# Patient Record
Sex: FEMALE | Race: BLACK OR AFRICAN AMERICAN | NOT HISPANIC OR LATINO | URBAN - METROPOLITAN AREA
[De-identification: names, ages, dates, MRNs, and addresses within clinical notes are randomized per-mention and may not be internally consistent; named-entity substitution may affect disease eponyms.]

---

## 2023-06-30 LAB
CHLAMYDIA TRACH., AMPLIFIED: NEGATIVE
N. GONORRHEA, AMPLIFIED: NEGATIVE
TRICHOMONAS VAGINALIS: NEGATIVE

## 2023-10-12 PROBLEM — N92.0 MENORRHAGIA WITH REGULAR CYCLE: Status: ACTIVE | Noted: 2023-10-12

## 2023-10-12 RX ORDER — INDAPAMIDE 1.25 MG/1
1 TABLET ORAL DAILY
COMMUNITY

## 2023-10-13 ENCOUNTER — OFFICE VISIT (OUTPATIENT)
Dept: GASTROENTEROLOGY | Facility: EXTERNAL LOCATION | Age: 48
End: 2023-10-13
Payer: COMMERCIAL

## 2023-10-13 DIAGNOSIS — K57.30 DIVERTICULOSIS OF LARGE INTESTINE WITHOUT DIVERTICULITIS: Primary | ICD-10-CM

## 2023-10-13 DIAGNOSIS — Z12.11 ENCOUNTER FOR SCREENING FOR MALIGNANT NEOPLASM OF COLON: ICD-10-CM

## 2023-10-13 PROCEDURE — 45378 DIAGNOSTIC COLONOSCOPY: CPT | Performed by: INTERNAL MEDICINE

## 2023-10-30 ENCOUNTER — ANCILLARY PROCEDURE (OUTPATIENT)
Dept: RADIOLOGY | Facility: CLINIC | Age: 48
End: 2023-10-30
Payer: COMMERCIAL

## 2023-10-30 DIAGNOSIS — Z12.39 ENCOUNTER FOR OTHER SCREENING FOR MALIGNANT NEOPLASM OF BREAST: ICD-10-CM

## 2023-10-30 PROCEDURE — 77067 SCR MAMMO BI INCL CAD: CPT

## 2023-10-30 PROCEDURE — 77067 SCR MAMMO BI INCL CAD: CPT | Mod: BILATERAL PROCEDURE | Performed by: RADIOLOGY

## 2023-10-30 PROCEDURE — 77063 BREAST TOMOSYNTHESIS BI: CPT | Mod: BILATERAL PROCEDURE | Performed by: RADIOLOGY

## 2023-11-06 ENCOUNTER — HOSPITAL ENCOUNTER (OUTPATIENT)
Dept: RADIOLOGY | Facility: EXTERNAL LOCATION | Age: 48
Discharge: HOME | End: 2023-11-06
Payer: COMMERCIAL

## 2024-05-22 ENCOUNTER — TELEPHONE (OUTPATIENT)
Dept: OBSTETRICS AND GYNECOLOGY | Facility: CLINIC | Age: 49
End: 2024-05-22
Payer: COMMERCIAL

## 2024-05-22 NOTE — TELEPHONE ENCOUNTER
Received request form Dr Lopez for NP appt with [facility] for Menorrhagia / attempted to reach pt on 05/21/2024 and today l/eft message to call office

## 2024-05-24 NOTE — TELEPHONE ENCOUNTER
Spoke to pt / NP appt sched for 08/15/2024 / for fibroids / heavy bleeding / offered sooner appt pt declined due conflict in her sched.

## 2024-08-15 ENCOUNTER — APPOINTMENT (OUTPATIENT)
Dept: OBSTETRICS AND GYNECOLOGY | Facility: CLINIC | Age: 49
End: 2024-08-15
Payer: COMMERCIAL

## 2024-08-15 ENCOUNTER — TELEPHONE (OUTPATIENT)
Dept: OBSTETRICS AND GYNECOLOGY | Facility: CLINIC | Age: 49
End: 2024-08-15
Payer: COMMERCIAL

## 2024-08-15 NOTE — TELEPHONE ENCOUNTER
Np to practice NP appt was sched with Dr Millard for today and Pt cancelled appt  through my chart  due to heavy bleeding today / pt was referred by Dr Garcia for menorrhagia / fibroids/ per Dr Millard pt would be better served by seeing one the office gyn surgeons / Appt sched for 09/18/2024 with Dr Tineo ( appt time and date per April )

## 2024-08-15 NOTE — PROGRESS NOTES
Annual  Subjective   Marni Gutierrez is a 49 y.o. female who is referred from pcp John d/t anemia/heavy menses/fibroids; last gyn exam 7/6/2023 w Dr. Carol Ann Malik at [facility]; prior to that w [facility]           Review of Systems    Objective   There were no vitals taken for this visit.       General:   Alert and oriented, in no acute distress   Neck: Supple. No visible thyromegaly.    Breast/Axilla: Normal to palpation bilaterally without masses, skin changes, or nipple discharge.    Abdomen: Soft, non-tender, without masses or organomegaly   Vulva: Normal architecture without erythema, masses, or lesions.    Vagina: Normal mucosa without lesions, masses, or atrophy. No abnormal vaginal discharge.    Cervix: Normal without masses, lesions, or signs of cervicitis   Uterus: Normal, mobile, non-enlarged uterus   Adnexa: Normal without masses or lesions   Pelvic Floor normal   Psych Normal affect. Normal mood.      Assessment/Plan   {Assess/Plan SmartLinks (Optional):02473}    Routine annual    Pap due***  Mammogram    Sandra Millard MD

## 2024-09-09 ENCOUNTER — APPOINTMENT (OUTPATIENT)
Dept: OBSTETRICS AND GYNECOLOGY | Facility: CLINIC | Age: 49
End: 2024-09-09
Payer: COMMERCIAL

## 2024-09-18 ENCOUNTER — OFFICE VISIT (OUTPATIENT)
Dept: OBSTETRICS AND GYNECOLOGY | Facility: CLINIC | Age: 49
End: 2024-09-18
Payer: COMMERCIAL

## 2024-09-18 VITALS
BODY MASS INDEX: 34.85 KG/M2 | SYSTOLIC BLOOD PRESSURE: 137 MMHG | HEIGHT: 62 IN | WEIGHT: 189.4 LBS | DIASTOLIC BLOOD PRESSURE: 85 MMHG

## 2024-09-18 DIAGNOSIS — D25.9 UTERINE LEIOMYOMA, UNSPECIFIED LOCATION: Primary | ICD-10-CM

## 2024-09-18 DIAGNOSIS — N92.1 MENORRHAGIA WITH IRREGULAR CYCLE: ICD-10-CM

## 2024-09-18 PROCEDURE — 99203 OFFICE O/P NEW LOW 30 MIN: CPT | Performed by: OBSTETRICS & GYNECOLOGY

## 2024-09-18 PROCEDURE — 3008F BODY MASS INDEX DOCD: CPT | Performed by: OBSTETRICS & GYNECOLOGY

## 2024-09-18 PROCEDURE — 99213 OFFICE O/P EST LOW 20 MIN: CPT | Performed by: OBSTETRICS & GYNECOLOGY

## 2024-09-18 RX ORDER — IBUPROFEN 800 MG/1
TABLET ORAL
Qty: 90 TABLET | Refills: 1 | Status: SHIPPED | OUTPATIENT
Start: 2024-09-18

## 2024-09-18 RX ORDER — FERROUS SULFATE 325(65) MG
1 TABLET ORAL
COMMUNITY
Start: 2024-05-09

## 2024-09-18 ASSESSMENT — PATIENT HEALTH QUESTIONNAIRE - PHQ9
2. FEELING DOWN, DEPRESSED OR HOPELESS: NOT AT ALL
1. LITTLE INTEREST OR PLEASURE IN DOING THINGS: NOT AT ALL
SUM OF ALL RESPONSES TO PHQ9 QUESTIONS 1 & 2: 0

## 2024-09-18 ASSESSMENT — LIFESTYLE VARIABLES
SKIP TO QUESTIONS 9-10: 0
HOW MANY STANDARD DRINKS CONTAINING ALCOHOL DO YOU HAVE ON A TYPICAL DAY: 3 OR 4
HOW OFTEN DO YOU HAVE SIX OR MORE DRINKS ON ONE OCCASION: NEVER
HOW OFTEN DO YOU HAVE A DRINK CONTAINING ALCOHOL: 2-4 TIMES A MONTH
AUDIT-C TOTAL SCORE: 3

## 2024-09-18 ASSESSMENT — ENCOUNTER SYMPTOMS
LOSS OF SENSATION IN FEET: 1
OCCASIONAL FEELINGS OF UNSTEADINESS: 0
DEPRESSION: 0

## 2024-09-18 ASSESSMENT — PAIN SCALES - GENERAL: PAINLEVEL: 1

## 2024-09-18 NOTE — PROGRESS NOTES
GYN OFFICE VISIT    Patient Name:  Marni Gutierrez  :  1975  MR #:  [id number]  Acct #:  [id number]      ASSESSMENT/PLAN:   1. Uterine leiomyoma, unspecified location    - ibuprofen 800 mg tablet; Take 1 tablet PO every 8 hours prn heavy menstrual flow or pain or fever  Dispense: 90 tablet; Refill: 1  - CBC; Future  - TSH; Future  - Ferritin; Future  - Iron and TIBC; Future    2. Menorrhagia with irregular cycle    - ibuprofen 800 mg tablet; Take 1 tablet PO every 8 hours prn heavy menstrual flow or pain or fever  Dispense: 90 tablet; Refill: 1  - CBC; Future  - TSH; Future  - Ferritin; Future  - Iron and TIBC; Future     -Discussed medication options: Ibuprofen, Myfembree, birth control for sxs control. Reviewed safety, RBA, adverse rxs, potential side effects.  -Discussed myomectomy, ASHLEY-BS.  Patient is likely to select ASHLEY after discussed of RBA.  -Will complete labs. FU virtual visit for planning in 1 month.    Chief Complaint   Patient presents with    New Patient Visit    Menstrual Problem       Marni Gutierrez is a 49 y.o. F  Patient's last menstrual period was 2024 (exact date). who presents for heavy periods. Known uterine fibroids.   Change pads Q 2hrs. No tampons. Not painful.   Painful intercourse, sometimes.   Vaginal discharge is not odorous, no itching. Seems normal.  No sxs of UTI. +Frequency.   Bowel habits are on and off.  Known left kidney solid tumor, 2.5 cm, monitoring.  Abdominal weight gain  ER visit - 2024    Unprotected sex. No pregnancies.    Past Medical History:   Diagnosis Date    Anemia     Asthma (HHS-HCC)     Fibroids     Menorrhagia        Past Surgical History:   Procedure Laterality Date    MYOMECTOMY  2003       Social History     Socioeconomic History    Marital status:      Spouse name: Not on file    Number of children: Not on file    Years of education: Not on file    Highest education level: Not on file   Occupational History    Not on file  "  Tobacco Use    Smoking status: Never    Smokeless tobacco: Never   Vaping Use    Vaping status: Never Used   Substance and Sexual Activity    Alcohol use: Not on file    Drug use: Never    Sexual activity: Yes     Birth control/protection: None   Other Topics Concern    Not on file   Social History Narrative    Not on file     Social Determinants of Health     Financial Resource Strain: Not on file   Food Insecurity: Not on file   Transportation Needs: Not on file   Physical Activity: Not on file   Stress: Not on file   Social Connections: Not on file   Intimate Partner Violence: Not At Risk (9/18/2024)    Humiliation, Afraid, Rape, and Kick questionnaire     Fear of Current or Ex-Partner: No     Emotionally Abused: No     Physically Abused: No     Sexually Abused: No   Housing Stability: Not on file       Family History   Problem Relation Name Age of Onset    Breast cancer Other  40        maternal cousin       Prior to Admission medications    Medication Sig Start Date End Date Taking? Authorizing Provider   ferrous sulfate, 325 mg ferrous sulfate, tablet Take 1 tablet (325 mg) by mouth every 12 hours. 5/9/24  Yes Historical Provider, MD   indapamide (Lozol) 1.25 mg tablet Take 1 tablet (1.25 mg) by mouth once daily.    Historical Provider, MD       Allergies   Allergen Reactions    Shellfish Derived Itching          ROS: Review of Systems  See HPI. Otherwise non-contributory    OBJECTIVE:   /85   Ht 1.575 m (5' 2\")   Wt 85.9 kg (189 lb 6.4 oz)   LMP 09/07/2024 (Exact Date) Comment: Very heavy , but regular  BMI 34.64 kg/m²   Body mass index is 34.64 kg/m².     Physical Exam  GENERAL:   General Appearance:  well-developed, well-nourished, no functional handicap, well-groomed.  Hygiene:  good.  Ill-appearance:  none.    HEENT: NC/AT head.  Neck is supple.  Speech:  clear.  Eye contact:  normal.  LUNGS:  Effort:  no respiratory distress.    ABDOMEN: Tenderness:  none.  Distention:  none. " Uteromegaly  GENITOURINARY - FEMALE: Bladder:  NT    Uterus:  : 20 week sized uterus and wide, non tender.  Adnexa:  Difficult to discern  DERMATOLOGY:  Skin:  normal.   EXTREMITIES: Normal:  no anomalies.  Edema:  none.    NEUROLOGICAL: Orientation:  alert and oriented x 3.   PSYCHOLOGY: Affect:  appropriate.  Mood:  pleasant.       Labs reviewed:  yes    Imaging reviewed:  yes    Note: This dictation was generated using Dragon voice recognition software. Please excuse any grammatical or spelling errors that may have occurred using the system.

## 2024-10-03 ENCOUNTER — LAB (OUTPATIENT)
Dept: LAB | Facility: LAB | Age: 49
End: 2024-10-03
Payer: COMMERCIAL

## 2024-10-03 DIAGNOSIS — N92.1 MENORRHAGIA WITH IRREGULAR CYCLE: ICD-10-CM

## 2024-10-03 DIAGNOSIS — D25.9 UTERINE LEIOMYOMA, UNSPECIFIED LOCATION: ICD-10-CM

## 2024-10-03 LAB
ERYTHROCYTE [DISTWIDTH] IN BLOOD BY AUTOMATED COUNT: 20.5 % (ref 11.5–14.5)
FERRITIN SERPL-MCNC: 18 NG/ML (ref 8–150)
HCT VFR BLD AUTO: 25 % (ref 36–46)
HGB BLD-MCNC: 6.7 G/DL (ref 12–16)
IRON SATN MFR SERPL: ABNORMAL %
IRON SERPL-MCNC: 14 UG/DL (ref 35–150)
MCH RBC QN AUTO: 18.7 PG (ref 26–34)
MCHC RBC AUTO-ENTMCNC: 26.8 G/DL (ref 32–36)
MCV RBC AUTO: 70 FL (ref 80–100)
NRBC BLD-RTO: 0 /100 WBCS (ref 0–0)
PLATELET # BLD AUTO: 234 X10*3/UL (ref 150–450)
RBC # BLD AUTO: 3.59 X10*6/UL (ref 4–5.2)
TIBC SERPL-MCNC: ABNORMAL UG/DL
TSH SERPL-ACNC: 1.74 MIU/L (ref 0.44–3.98)
UIBC SERPL-MCNC: >450 UG/DL (ref 110–370)
WBC # BLD AUTO: 4.8 X10*3/UL (ref 4.4–11.3)

## 2024-10-03 PROCEDURE — 82728 ASSAY OF FERRITIN: CPT

## 2024-10-03 PROCEDURE — 84443 ASSAY THYROID STIM HORMONE: CPT

## 2024-10-03 PROCEDURE — 36415 COLL VENOUS BLD VENIPUNCTURE: CPT

## 2024-10-03 PROCEDURE — 83540 ASSAY OF IRON: CPT

## 2024-10-03 PROCEDURE — 85027 COMPLETE CBC AUTOMATED: CPT

## 2024-10-03 PROCEDURE — 83550 IRON BINDING TEST: CPT

## 2024-10-10 ENCOUNTER — TELEMEDICINE (OUTPATIENT)
Dept: OBSTETRICS AND GYNECOLOGY | Facility: CLINIC | Age: 49
End: 2024-10-10
Payer: COMMERCIAL

## 2024-10-10 DIAGNOSIS — N92.0 MENORRHAGIA WITH REGULAR CYCLE: ICD-10-CM

## 2024-10-10 DIAGNOSIS — N92.1 MENORRHAGIA WITH IRREGULAR CYCLE: ICD-10-CM

## 2024-10-10 DIAGNOSIS — D50.0 ANEMIA DUE TO BLOOD LOSS, CHRONIC: Primary | ICD-10-CM

## 2024-10-10 DIAGNOSIS — N85.2 UTERINE HYPERTROPHY: ICD-10-CM

## 2024-10-10 DIAGNOSIS — D25.9 UTERINE LEIOMYOMA, UNSPECIFIED LOCATION: ICD-10-CM

## 2024-10-10 PROCEDURE — 99213 OFFICE O/P EST LOW 20 MIN: CPT | Performed by: OBSTETRICS & GYNECOLOGY

## 2024-10-10 PROCEDURE — 99213 OFFICE O/P EST LOW 20 MIN: CPT | Mod: 95 | Performed by: OBSTETRICS & GYNECOLOGY

## 2024-10-10 RX ORDER — ALBUTEROL SULFATE 0.83 MG/ML
3 SOLUTION RESPIRATORY (INHALATION) AS NEEDED
OUTPATIENT
Start: 2024-10-14

## 2024-10-10 RX ORDER — FAMOTIDINE 10 MG/ML
20 INJECTION INTRAVENOUS ONCE AS NEEDED
OUTPATIENT
Start: 2024-10-14

## 2024-10-10 RX ORDER — EPINEPHRINE 0.3 MG/.3ML
0.3 INJECTION SUBCUTANEOUS EVERY 5 MIN PRN
OUTPATIENT
Start: 2024-10-14

## 2024-10-10 RX ORDER — DIPHENHYDRAMINE HYDROCHLORIDE 50 MG/ML
50 INJECTION INTRAMUSCULAR; INTRAVENOUS AS NEEDED
OUTPATIENT
Start: 2024-10-14

## 2024-10-10 RX ORDER — RELUGOLIX, ESTRADIOL HEMIHYDRATE, AND NORETHINDRONE ACETATE 40; 1; .5 MG/1; MG/1; MG/1
1 TABLET, FILM COATED ORAL DAILY
Qty: 90 TABLET | Refills: 3 | Status: SHIPPED | OUTPATIENT
Start: 2024-10-10 | End: 2025-01-08

## 2024-10-10 NOTE — PROGRESS NOTES
Consent:  Verbal consent was requested and obtained from patient on this date for a telehealth visit to determine if a office visit would be necessary for the patient's complaint(s).    Referring Physician: No referring provider defined for this encounter.  Primary Care Provider: Jose Miguel Lopez MD     Subjective    HPI: 49-year-old -American female for follow-up.  She completed labs to screen for her anemia status.  She was thinking about her treatment options as discussed in the last visit.  She was offered pharmacotherapy for menstrual control versus total abdominal hysterectomy with BS due to size of uterus.  History of blood transfusion.  No significant pain.    Past Medical History:   Diagnosis Date    Anemia     Asthma (HHS-HCC)     childhood.    Fibroids     Menorrhagia         Past Surgical History:   Procedure Laterality Date    MYOMECTOMY  2003        Social History     Tobacco Use    Smoking status: Never    Smokeless tobacco: Never   Vaping Use    Vaping status: Never Used   Substance Use Topics    Drug use: Never        Current Outpatient Medications   Medication Instructions    ferrous sulfate, 325 mg ferrous sulfate, tablet 1 tablet, oral, Every 12 hours scheduled (0630,1830)    ibuprofen 800 mg tablet Take 1 tablet PO every 8 hours prn heavy menstrual flow or pain or fever    indapamide (Lozol) 1.25 mg tablet 1 tablet, oral, Daily      Objective    BSA: There is no height or weight on file to calculate BSA.  There were no vitals taken for this visit.     Physical Exam  General: AAOx3 in NAD   Psych: mood and affect are appropiate. Able to consent.     Previous pelvic ultrasound: Enlarged multifibroid uterus.    Labs: Iron deficiency anemia    Assessment/Plan    1. Anemia due to blood loss, chronic (Primary)    - iron, carb-FA-C-B6-B12-zinc 150-1.-10 mg tablet; Take 1 tablet by mouth once daily.  Dispense: 90 tablet; Refill: 3  - relugolix-estradiol-norethindr (Myfembree) 40-1-0.5  mg tablet; Take 1 tablet by mouth once daily.  Dispense: 90 tablet; Refill: 3  -Iron infusions    2. Uterine leiomyoma, unspecified location    - relugolix-estradiol-norethindr (Myfembree) 40-1-0.5 mg tablet; Take 1 tablet by mouth once daily.  Dispense: 90 tablet; Refill: 3    3. Menorrhagia with irregular cycle    - relugolix-estradiol-norethindr (Myfembree) 40-1-0.5 mg tablet; Take 1 tablet by mouth once daily.  Dispense: 90 tablet; Refill: 3    4. Uterine hypertrophy      5. Menorrhagia with regular cycle        Treatment Plans    -Discussed medication options: Ibuprofen, Myfembree, birth control for sxs control. Reviewed safety, RBA, adverse rxs, potential side effects.  -Discussed myomectomy, ASHLEY-BS.  She will complete therapy for the anemia to reduce surgical risks.  Repeat labs in about 3 months.     All new and/or current medications discussed and reviewed including side effects with patient/caregiver, Understanding:  Caregiver/Patient expressed understanding., Adherence:  Barriers to adherence identified and discussed if present.

## 2024-12-17 ENCOUNTER — TELEMEDICINE (OUTPATIENT)
Dept: OBSTETRICS AND GYNECOLOGY | Facility: CLINIC | Age: 49
End: 2024-12-17
Payer: COMMERCIAL

## 2024-12-17 DIAGNOSIS — D25.9 UTERINE LEIOMYOMA, UNSPECIFIED LOCATION: ICD-10-CM

## 2024-12-17 DIAGNOSIS — N85.2 UTERINE HYPERTROPHY: ICD-10-CM

## 2024-12-17 DIAGNOSIS — Z51.81 MEDICATION MONITORING ENCOUNTER: ICD-10-CM

## 2024-12-17 DIAGNOSIS — D50.0 ANEMIA DUE TO BLOOD LOSS, CHRONIC: Primary | ICD-10-CM

## 2024-12-17 DIAGNOSIS — N92.1 MENORRHAGIA WITH IRREGULAR CYCLE: ICD-10-CM

## 2024-12-17 PROCEDURE — 99213 OFFICE O/P EST LOW 20 MIN: CPT | Performed by: OBSTETRICS & GYNECOLOGY

## 2024-12-17 NOTE — PROGRESS NOTES
Consent:  Verbal consent was requested and obtained from patient on this date for a telehealth visit to determine if a office visit would be necessary for the patient's complaint(s).    Referring Physician: No referring provider defined for this encounter.  Primary Care Provider: Jose Miguel Lopez MD     Subjective    HPI: 50 yo AAF G0 for follow-up.   Before Myfembree - 2 heavy flow days, 2 normal days.  Myfembree - no heavy flow, but prolonged, light flow to nothing. No side effects. No mood swings. Breakthrough once.  No headaches, no N/V.  She had some cramping before and during the period.   Taking oral iron. Rare constipation.      Past Medical History:   Diagnosis Date    Anemia     Asthma (HHS-HCC)     childhood.    Fibroids     Menorrhagia         Past Surgical History:   Procedure Laterality Date    MYOMECTOMY  2003        Social History     Tobacco Use    Smoking status: Never    Smokeless tobacco: Never   Vaping Use    Vaping status: Never Used   Substance Use Topics    Drug use: Never        Current Outpatient Medications   Medication Instructions    iron, carb-FA-C-B6-B12-zinc 150-1.-10 mg tablet 1 tablet, oral, Daily    relugolix-estradiol-norethindr (Myfembree) 40-1-0.5 mg tablet 1 tablet, oral, Daily      Objective    BSA: There is no height or weight on file to calculate BSA.  There were no vitals taken for this visit.     Physical Exam  General: AAOx3 in NAD   Psych: mood and affect are appropiate. Able to consent.     Pelvic ultrasound 2019.  Last pelvic imaging by CT scan in 2024 to follow or examine a benign right renal mass.  Blood work October/2024: Hemoglobin 6.7, hematocrit 25.  Iron panel revealed deficiency.    Assessment/Plan       Diagnoses and all orders for this visit:  Anemia due to blood loss, chronic  -     MR pelvis w and wo IV contrast; Future  -     Iron and TIBC; Future  -     TSH; Future  -     CBC; Future  -     Comprehensive Metabolic Panel; Future  Menorrhagia with  irregular cycle  -     MR pelvis w and wo IV contrast; Future  -     Iron and TIBC; Future  -     TSH; Future  -     CBC; Future  -     Comprehensive Metabolic Panel; Future  Uterine hypertrophy  -     MR pelvis w and wo IV contrast; Future  -     Iron and TIBC; Future  -     TSH; Future  -     CBC; Future  -     Comprehensive Metabolic Panel; Future  Uterine leiomyoma, unspecified location  -     MR pelvis w and wo IV contrast; Future  -     Iron and TIBC; Future  -     TSH; Future  -     CBC; Future  -     Comprehensive Metabolic Panel; Future  Medication monitoring encounter  -     Iron and TIBC; Future  -     TSH; Future  -     CBC; Future  -     Comprehensive Metabolic Panel; Future       Treatment Plans    Discussed switching to Lupron Depo plus or minus norethindrone.  The patient wishes to continue with Myfembree.  The patient states the best time of the year for her for her total abdominal hysterectomy is the spring 2025.  Will continue therapy and iron supplementation until then.  Follow-up for preoperative exam.     All new and/or current medications discussed and reviewed including side effects with patient/caregiver, Understanding:  Caregiver/Patient expressed understanding., Adherence:  Barriers to adherence identified and discussed if present.

## 2024-12-18 ENCOUNTER — TELEPHONE (OUTPATIENT)
Dept: OBSTETRICS AND GYNECOLOGY | Facility: CLINIC | Age: 49
End: 2024-12-18
Payer: COMMERCIAL

## 2024-12-18 NOTE — TELEPHONE ENCOUNTER
----- Message from Rosa Tineo sent at 12/17/2024  5:52 PM EST -----  Regarding: Telehealth appointment 12/17  -Completed the visit.  Please call the patient to schedule an office preop follow-up in 3 months.  -Please mail the after visit summary and orders for blood and MRI of the pelvis to her.  She will have these completed at [facility].  -Provider to place orders for hysterectomy Spring 2025.

## 2025-01-06 ENCOUNTER — HOSPITAL ENCOUNTER (OUTPATIENT)
Dept: RADIOLOGY | Facility: EXTERNAL LOCATION | Age: 50
Discharge: HOME | End: 2025-01-06
Payer: COMMERCIAL

## 2025-01-06 DIAGNOSIS — D50.0 ANEMIA DUE TO BLOOD LOSS, CHRONIC: ICD-10-CM

## 2025-01-06 DIAGNOSIS — N92.1 MENORRHAGIA WITH IRREGULAR CYCLE: ICD-10-CM

## 2025-01-06 DIAGNOSIS — D25.9 UTERINE LEIOMYOMA, UNSPECIFIED LOCATION: ICD-10-CM

## 2025-01-06 DIAGNOSIS — N85.2 UTERINE HYPERTROPHY: ICD-10-CM

## 2025-02-26 DIAGNOSIS — D25.9 UTERINE LEIOMYOMA, UNSPECIFIED LOCATION: Primary | ICD-10-CM

## 2025-02-26 DIAGNOSIS — N85.2 UTERINE HYPERTROPHY: ICD-10-CM

## 2025-02-26 DIAGNOSIS — D50.0 ANEMIA DUE TO BLOOD LOSS, CHRONIC: ICD-10-CM

## 2025-02-26 DIAGNOSIS — N92.1 MENORRHAGIA WITH IRREGULAR CYCLE: ICD-10-CM

## 2025-02-26 NOTE — PROGRESS NOTES
Renewing order for MRI of Pelvis for uterine fibroids and blood loss anemia.  The patient has been  on Myfembree and iron infusions for several months now.

## 2025-03-09 ENCOUNTER — APPOINTMENT (OUTPATIENT)
Dept: RADIOLOGY | Facility: HOSPITAL | Age: 50
End: 2025-03-09
Payer: COMMERCIAL

## 2025-03-22 ENCOUNTER — HOSPITAL ENCOUNTER (OUTPATIENT)
Dept: RADIOLOGY | Facility: HOSPITAL | Age: 50
Discharge: HOME | End: 2025-03-22
Payer: COMMERCIAL

## 2025-03-22 DIAGNOSIS — N85.2 UTERINE HYPERTROPHY: ICD-10-CM

## 2025-03-22 DIAGNOSIS — D50.0 ANEMIA DUE TO BLOOD LOSS, CHRONIC: ICD-10-CM

## 2025-03-22 DIAGNOSIS — N92.1 MENORRHAGIA WITH IRREGULAR CYCLE: ICD-10-CM

## 2025-03-22 DIAGNOSIS — D25.9 UTERINE LEIOMYOMA, UNSPECIFIED LOCATION: ICD-10-CM

## 2025-03-22 PROCEDURE — 72197 MRI PELVIS W/O & W/DYE: CPT

## 2025-03-22 PROCEDURE — 2550000001 HC RX 255 CONTRASTS: Performed by: OBSTETRICS & GYNECOLOGY

## 2025-03-22 PROCEDURE — A9575 INJ GADOTERATE MEGLUMI 0.1ML: HCPCS | Performed by: OBSTETRICS & GYNECOLOGY

## 2025-03-22 RX ORDER — GADOTERATE MEGLUMINE 376.9 MG/ML
20 INJECTION INTRAVENOUS
Status: COMPLETED | OUTPATIENT
Start: 2025-03-22 | End: 2025-03-22

## 2025-03-22 RX ADMIN — GADOTERATE MEGLUMINE 18 ML: 376.9 INJECTION INTRAVENOUS at 12:18

## 2025-04-14 ENCOUNTER — APPOINTMENT (OUTPATIENT)
Dept: OBSTETRICS AND GYNECOLOGY | Facility: CLINIC | Age: 50
End: 2025-04-14
Payer: COMMERCIAL

## 2025-05-05 ENCOUNTER — OFFICE VISIT (OUTPATIENT)
Dept: OBSTETRICS AND GYNECOLOGY | Facility: CLINIC | Age: 50
End: 2025-05-05
Payer: COMMERCIAL

## 2025-05-05 VITALS
DIASTOLIC BLOOD PRESSURE: 83 MMHG | HEIGHT: 62 IN | BODY MASS INDEX: 35.15 KG/M2 | WEIGHT: 191 LBS | SYSTOLIC BLOOD PRESSURE: 135 MMHG

## 2025-05-05 DIAGNOSIS — Z51.81 MEDICATION MONITORING ENCOUNTER: ICD-10-CM

## 2025-05-05 DIAGNOSIS — D25.9 UTERINE LEIOMYOMA, UNSPECIFIED LOCATION: ICD-10-CM

## 2025-05-05 DIAGNOSIS — N92.1 MENORRHAGIA WITH IRREGULAR CYCLE: Primary | ICD-10-CM

## 2025-05-05 DIAGNOSIS — N85.2 UTERINE HYPERTROPHY: ICD-10-CM

## 2025-05-05 DIAGNOSIS — Z12.31 ENCOUNTER FOR SCREENING MAMMOGRAM FOR MALIGNANT NEOPLASM OF BREAST: ICD-10-CM

## 2025-05-05 DIAGNOSIS — D50.0 ANEMIA DUE TO BLOOD LOSS, CHRONIC: ICD-10-CM

## 2025-05-05 PROCEDURE — 3008F BODY MASS INDEX DOCD: CPT | Performed by: OBSTETRICS & GYNECOLOGY

## 2025-05-05 PROCEDURE — 99213 OFFICE O/P EST LOW 20 MIN: CPT | Performed by: OBSTETRICS & GYNECOLOGY

## 2025-05-05 RX ORDER — NORETHINDRONE 5 MG/1
TABLET ORAL
Qty: 21 TABLET | Refills: 0 | Status: SHIPPED | OUTPATIENT
Start: 2025-05-05

## 2025-05-05 RX ORDER — IRON,CARBONYL/ASCORBIC ACID 100-250 MG
1 TABLET ORAL
COMMUNITY
Start: 2024-10-14

## 2025-05-05 RX ORDER — HYDROCHLOROTHIAZIDE 25 MG/1
1 TABLET ORAL
COMMUNITY
Start: 2025-03-18

## 2025-05-05 ASSESSMENT — SOCIAL DETERMINANTS OF HEALTH (SDOH)
WITHIN THE LAST YEAR, HAVE YOU BEEN KICKED, HIT, SLAPPED, OR OTHERWISE PHYSICALLY HURT BY YOUR PARTNER OR EX-PARTNER?: NO
WITHIN THE LAST YEAR, HAVE YOU BEEN AFRAID OF YOUR PARTNER OR EX-PARTNER?: NO
WITHIN THE LAST YEAR, HAVE TO BEEN RAPED OR FORCED TO HAVE ANY KIND OF SEXUAL ACTIVITY BY YOUR PARTNER OR EX-PARTNER?: NO
WITHIN THE LAST YEAR, HAVE YOU BEEN HUMILIATED OR EMOTIONALLY ABUSED IN OTHER WAYS BY YOUR PARTNER OR EX-PARTNER?: NO

## 2025-05-05 ASSESSMENT — PAIN SCALES - GENERAL: PAINLEVEL_OUTOF10: 1

## 2025-05-05 ASSESSMENT — LIFESTYLE VARIABLES
HOW OFTEN DO YOU HAVE A DRINK CONTAINING ALCOHOL: 2-4 TIMES A MONTH
HOW MANY STANDARD DRINKS CONTAINING ALCOHOL DO YOU HAVE ON A TYPICAL DAY: 1 OR 2
AUDIT-C TOTAL SCORE: 2
HOW OFTEN DO YOU HAVE SIX OR MORE DRINKS ON ONE OCCASION: NEVER
SKIP TO QUESTIONS 9-10: 1

## 2025-05-05 ASSESSMENT — ENCOUNTER SYMPTOMS
OCCASIONAL FEELINGS OF UNSTEADINESS: 1
LOSS OF SENSATION IN FEET: 0
DEPRESSION: 0

## 2025-05-05 NOTE — PROGRESS NOTES
GYN OFFICE VISIT    Patient Name:  Marni Gutierrez  :  1975  MR #:  [id number]  Acct #:  [id number]      ASSESSMENT/PLAN:   1. Menorrhagia with irregular cycle (Primary)  -No longer a good candidate for Myfembree due to elevation in blood pressure to severe range.  - CBC; Future  - Ferritin; Future  - Iron and TIBC; Future  - Referral to Gynecology; Future  - norethindrone (Aygestin) 5 mg tablet; Take 1 tab PO daily to initiate therapy, then 1/2 tablet daily until procedure. (Increase to 1 tab for breakthrough bleeding).  Dispense: 21 tablet; Refill: 0  - CBC  - Ferritin  - Iron and TIBC  - TSH with reflex to Free T4 if abnormal; Future  - TSH with reflex to Free T4 if abnormal    2. Anemia due to blood loss, chronic    - CBC; Future  - Ferritin; Future  - Iron and TIBC; Future  - Referral to Gynecology; Future  - norethindrone (Aygestin) 5 mg tablet; Take 1 tab PO daily to initiate therapy, then 1/2 tablet daily until procedure. (Increase to 1 tab for breakthrough bleeding).  Dispense: 21 tablet; Refill: 0  - CBC  - Ferritin  - Iron and TIBC  - TSH with reflex to Free T4 if abnormal; Future  - TSH with reflex to Free T4 if abnormal    3. Uterine hypertrophy      4. Uterine leiomyoma, unspecified location      5. Medication monitoring encounter    - CBC; Future  - Ferritin; Future  - Iron and TIBC; Future  - CBC  - Ferritin  - Iron and TIBC    6. Menorrhagia with regular cycle       Patient has a left renal mass that needs to be excised.  The urologist is planning robotic excision.  She needs a hysterectomy and is hoping for robotics to be performed by a [facility] surgeon same day as urological procedure at [facility] for coordinating surgical care.  Patient advised to seek her central scheduling department to help find [facility] surgeon in her network.     -Medication education:   All new and/or current medications discussed and reviewed including side effects with patient/caregiver, Understanding:   Caregiver/Patient expressed understanding., Adherence:  Barriers to adherence identified and discussed if present.  -Preventative hygiene/STIs reviewed.       FU prn      Chief Complaint   Patient presents with    Pre-op Clearance         Marni Gutierrez is a 49 y.o. C. Female who presents for  .   Patient's last menstrual period was 04/19/2025 (approximate)..  Episode of emergent HTN -evaluated by urology.  Started on HCTZ 25 mg daily.  Had to come off of MyfeBlueSnap in April after about 5 months of use - the high was 232/--  She has recent blood work completed at [facility].  Requested those results.  She still had some trouble   Chest pain or shortness of breath.  She has noticed less fluid on her legs. She feels fatigued.    Medical History[1]    Surgical History[2]    Social History[3]     Family History[4]    Current Medications[5]     Allergies[6]     ROS:  WHS - GENERAL:          Chills no.  Fever no.  Chills no.  Loss of Weight no.   Fatigue no.  Weight gain no.    Stable weight  W[facility] - RESPIRATORY:          Shortness of breath no.    W[facility] - CARDIOVASCULAR:          Chest Pain no.  High Blood Pressure no.   Rapid Heart Beat no.  Swelling of ankles yes - moderate but improved on hydrochlorothiazide.  W[facility] - GASTROINTESTINAL:          Appetite Poor no.  Bloating yes Constipation sometimes - especially on oral iron Diarrhea no.  Nausea no.  Rectal Bleeding no.  Stomach Pain rare  Vomiting no.    W[facility] - GENITO-URINARY:          Blood in Urine no.  Frequent Urination yes  Lack of Bladder Control no.  Painful Urination no.  Heavy/Irregular periods yes .  Vaginal discharge no.  Vaginal odor no.  Vaginal itching no.  Post-coital bleeding no.      WHS - MUSCLE/JOINT/BONE:          Back  low mid - yes  Muscle pain no.      WHS - SKIN:          Bruise easily no.  Itching no.    Rash no. .  Vulvar Lesions no.   WHS - ROS Update:          Depression or anxiety problems no.        OBJECTIVE:   /83   Ht 1.575 m (5'  "2\")   Wt 86.6 kg (191 lb)   LMP 04/19/2025 (Approximate)   BMI 34.93 kg/m²   Body mass index is 34.93 kg/m².     Physical Exam  GENERAL:   General Appearance:  well-developed, obese, functional handicap, well-groomed.  Hygiene:  good.  Ill-appearance:  none.    HEENT: NC/AT head.  Neck is supple.  Speech:  clear.  Eye contact:  normal.  LUNGS:  Normal effort; no respiratory distress.    HEART: RRR with S1/S2 w/o M/C/R  ABDOMEN: Tenderness:  none.  Distention:  none.   GENITOURINARY - FEMALE: Uterus is firm, nontender, up to the umbilicus, 20 weeks size and wide.  DERMATOLOGY:  Skin:  normal.   EXTREMITIES: Normal:  no anomalies.  Edema:  none.    NEUROLOGICAL: Orientation:  alert and oriented x 3.   PSYCHOLOGY: Affect:  appropriate.  Mood:  pleasant.     Previous/current LABS reviewed: Yes  Previous/current RADIOLOGY reviewed: Yes - MRI 3/2025 - Normal ovaries B/L.  Enlarged leiomyomatous uterus as detailed above with multiple submucosal, intramural and [assumed subserosal] fibroids that show progressive postcontrast enhancement. Single left intramural fibroid shows cystic degeneration which shows no significant postcontrast enhancement.    Note: This dictation was generated using Dragon voice recognition software. Please excuse any grammatical or spelling errors that may have occurred using the system.           [1]   Past Medical History:  Diagnosis Date    Abnormal Pap smear of cervix     Anemia     Asthma     childhood.    Female infertility     Fibroid     Fibroids     Left renal mass     Several years. Slowly enlarging neoplasm.    Menorrhagia     Urinary tract infection     Urogenital trichomoniasis    [2]   Past Surgical History:  Procedure Laterality Date    MYOMECTOMY  2003    By open laparotomy   [3]   Social History  Tobacco Use    Smoking status: Never    Smokeless tobacco: Never   Vaping Use    Vaping status: Never Used   Substance Use Topics    Alcohol use: Yes     Alcohol/week: 1.0 standard drink " of alcohol     Types: 1 Glasses of wine per week    Drug use: Never   [4]   Family History  Problem Relation Name Age of Onset    Breast cancer Other  40        maternal cousin    Cancer Father Gerald White    [5]   Current Outpatient Medications:     hydroCHLOROthiazide (HYDRODiuril) 25 mg tablet, Take 1 tablet (25 mg) by mouth early in the morning.., Disp: , Rfl:     iron,carbonyl-vitamin C 100-250 mg tablet, Take 1 tablet by mouth early in the morning.., Disp: , Rfl:     norethindrone (Aygestin) 5 mg tablet, Take 1 tab PO daily to initiate therapy, then 1/2 tablet daily until procedure. (Increase to 1 tab for breakthrough bleeding)., Disp: 21 tablet, Rfl: 0  [6]   Allergies  Allergen Reactions    Shellfish Derived Itching

## 2025-05-06 DIAGNOSIS — D50.0 ANEMIA DUE TO BLOOD LOSS, CHRONIC: Primary | ICD-10-CM

## 2025-05-06 DIAGNOSIS — E63.9 NUTRITIONAL DEFICIENCY: ICD-10-CM

## 2025-05-06 LAB
ERYTHROCYTE [DISTWIDTH] IN BLOOD BY AUTOMATED COUNT: 21.6 % (ref 11–15)
FERRITIN SERPL-MCNC: 5 NG/ML (ref 16–232)
HCT VFR BLD AUTO: 31 % (ref 35–45)
HGB BLD-MCNC: 8.3 G/DL (ref 11.7–15.5)
IRON SATN MFR SERPL: 3 % (CALC) (ref 16–45)
IRON SERPL-MCNC: 12 MCG/DL (ref 40–190)
MCH RBC QN AUTO: 19.5 PG (ref 27–33)
MCHC RBC AUTO-ENTMCNC: 26.8 G/DL (ref 32–36)
MCV RBC AUTO: 72.9 FL (ref 80–100)
PLATELET # BLD AUTO: 286 THOUSAND/UL (ref 140–400)
PMV BLD REES-ECKER: 8.9 FL (ref 7.5–12.5)
RBC # BLD AUTO: 4.25 MILLION/UL (ref 3.8–5.1)
TIBC SERPL-MCNC: 436 MCG/DL (CALC) (ref 250–450)
TSH SERPL-ACNC: 2.08 MIU/L
WBC # BLD AUTO: 4.2 THOUSAND/UL (ref 3.8–10.8)

## 2025-05-19 ENCOUNTER — APPOINTMENT (OUTPATIENT)
Dept: INFUSION THERAPY | Facility: HOSPITAL | Age: 50
End: 2025-05-19
Payer: COMMERCIAL

## 2025-05-19 ENCOUNTER — APPOINTMENT (OUTPATIENT)
Dept: HEMATOLOGY/ONCOLOGY | Facility: HOSPITAL | Age: 50
End: 2025-05-19
Payer: COMMERCIAL

## 2025-08-28 ENCOUNTER — APPOINTMENT (OUTPATIENT)
Dept: HEMATOLOGY/ONCOLOGY | Facility: CLINIC | Age: 50
End: 2025-08-28
Payer: COMMERCIAL

## 2025-08-28 DIAGNOSIS — D64.9 ANEMIA, UNSPECIFIED TYPE: Primary | ICD-10-CM
